# Patient Record
Sex: FEMALE | Race: WHITE
[De-identification: names, ages, dates, MRNs, and addresses within clinical notes are randomized per-mention and may not be internally consistent; named-entity substitution may affect disease eponyms.]

---

## 2017-06-09 ENCOUNTER — HOSPITAL ENCOUNTER (EMERGENCY)
Dept: HOSPITAL 25 - ED | Age: 67
Discharge: HOME | End: 2017-06-09
Payer: MEDICARE

## 2017-06-09 VITALS — DIASTOLIC BLOOD PRESSURE: 86 MMHG | SYSTOLIC BLOOD PRESSURE: 118 MMHG

## 2017-06-09 DIAGNOSIS — Z87.891: ICD-10-CM
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
## 2017-06-09 DIAGNOSIS — F10.129: ICD-10-CM
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
## 2017-06-09 DIAGNOSIS — S00.91XA: ICD-10-CM

## 2017-06-09 DIAGNOSIS — R51: ICD-10-CM

## 2017-06-09 DIAGNOSIS — S00.93XA: Primary | ICD-10-CM

## 2017-06-09 LAB
ALBUMIN SERPL BCG-MCNC: 4 G/DL (ref 3.2–5.2)
ALP SERPL-CCNC: 101 U/L (ref 34–104)
ALT SERPL W P-5'-P-CCNC: 11 U/L (ref 7–52)
ANION GAP SERPL CALC-SCNC: 9 MMOL/L (ref 2–11)
AST SERPL-CCNC: 25 U/L (ref 13–39)
BUN SERPL-MCNC: 7 MG/DL (ref 6–24)
BUN/CREAT SERPL: 10.8 (ref 8–20)
CALCIUM SERPL-MCNC: 9.4 MG/DL (ref 8.6–10.3)
CHLORIDE SERPL-SCNC: 103 MMOL/L (ref 101–111)
GLOBULIN SER CALC-MCNC: 3.6 G/DL (ref 2–4)
GLUCOSE SERPL-MCNC: 100 MG/DL (ref 70–100)
HCO3 SERPL-SCNC: 28 MMOL/L (ref 22–32)
HCT VFR BLD AUTO: 35 % (ref 35–47)
HGB BLD-MCNC: 10.7 G/DL (ref 12–16)
MCH RBC QN AUTO: 25 PG (ref 27–31)
MCHC RBC AUTO-ENTMCNC: 31 G/DL (ref 31–36)
MCV RBC AUTO: 82 FL (ref 80–97)
POTASSIUM SERPL-SCNC: 3.1 MMOL/L (ref 3.5–5)
PROT SERPL-MCNC: 7.6 G/DL (ref 6.4–8.9)
RBC # BLD AUTO: 4.29 10^6/UL (ref 4–5.4)
SODIUM SERPL-SCNC: 140 MMOL/L (ref 133–145)
TROPONIN I SERPL-MCNC: 0 NG/ML (ref ?–0.04)
WBC # BLD AUTO: 5.9 10^3/UL (ref 3.5–10.8)

## 2017-06-09 PROCEDURE — 70450 CT HEAD/BRAIN W/O DYE: CPT

## 2017-06-09 PROCEDURE — 85025 COMPLETE CBC W/AUTO DIFF WBC: CPT

## 2017-06-09 PROCEDURE — 93005 ELECTROCARDIOGRAM TRACING: CPT

## 2017-06-09 PROCEDURE — 83605 ASSAY OF LACTIC ACID: CPT

## 2017-06-09 PROCEDURE — 80320 DRUG SCREEN QUANTALCOHOLS: CPT

## 2017-06-09 PROCEDURE — 72125 CT NECK SPINE W/O DYE: CPT

## 2017-06-09 PROCEDURE — 80053 COMPREHEN METABOLIC PANEL: CPT

## 2017-06-09 PROCEDURE — 85610 PROTHROMBIN TIME: CPT

## 2017-06-09 PROCEDURE — G0480 DRUG TEST DEF 1-7 CLASSES: HCPCS

## 2017-06-09 PROCEDURE — 36415 COLL VENOUS BLD VENIPUNCTURE: CPT

## 2017-06-09 PROCEDURE — 99282 EMERGENCY DEPT VISIT SF MDM: CPT

## 2017-06-09 PROCEDURE — 84484 ASSAY OF TROPONIN QUANT: CPT

## 2017-06-09 NOTE — RAD
INDICATION:  Head injury.



COMPARISON:  Comparison is made with a prior CT of the brain from March 24, 2015.



TECHNIQUE: Contiguous axial sections of the brain were obtained from the skull base to the

vertex without contrast.



FINDINGS: The ventricles, cisterns and sulci are enlarged consistent with diffuse atrophy.

 There are small areas of decreased density in the subcortical and periventricular white

matter suggestive of mild chronic small vessel ischemic changes. There is no evidence for

hemorrhage.



There is soft tissue swelling and hematoma in the scalp adjacent to the posterior right

parietal bone measuring 4.6 x 2.0 cm in size. There is also a small amount of air within

the soft tissues consistent with a laceration type injury. No fracture is seen.



The visual is portion of the paranasal sinuses and mastoid air cells appear clear.



IMPRESSION:  

1. NO EVIDENCE FOR ACUTE INTRACRANIAL ABNORMALITY.

2. SOFT TISSUE SWELLING AND HEMATOMA IN THE SCALP ADJACENT TO THE POSTERIOR RIGHT PARIETAL

BONE.

## 2017-06-09 NOTE — RAD
INDICATION:  Fall, head injury.



COMPARISON:  There are no prior studies available for comparison.



TECHNIQUE: Contiguous axial sections were obtained from the skull base through the C7

vertebra. Images were reconstructed in the sagittal and coronal planes.



FINDINGS: There is straightening of the cervical spine with loss of the normal cervical

lordosis. No prevertebral soft tissue swelling or fracture is seen.



At the C2-C3 level there is mild posterior uncinate process spurring and moderate

hypertrophic changes within the facet joints. No spinal canal narrowing is present. There

is mild neural foraminal narrowing on the right side.



At the C3-C4 level there is mild posterior uncinate process spurring and moderate

hypertrophic changes within the facet joint on the left side. No spinal canal narrowing is

present. There is mild neural foraminal narrowing on the left side.



At the C4-C5 level there is mild posterior uncinate process spurring and mild hypertrophic

changes within the facet joints. No significant spinal canal narrowing is present. There

is mild to moderate bilateral neural foraminal narrowing.



At the C5-C6 level there is mild to moderate posterior uncinate process spurring. This

causes mild spinal canal narrowing. There is moderate bilateral neural foraminal

narrowing.



At the C6-C7 level there is mild to moderate posterior uncinate process spurring. This

causes mild spinal canal narrowing. There is moderate bilateral neural foraminal

narrowing.



Images through the lung apices demonstrate a curvilinear nodular density at the right lung

apex. Recommend a follow-up chest x-ray for further evaluation.



IMPRESSION:  

1. NO EVIDENCE FOR FRACTURE OR SUBLUXATION.

2. MODERATE CERVICAL SPONDYLOSIS.

3. ABNORMAL DENSITY AT THE RIGHT LUNG APEX. RECOMMEND A FOLLOW-UP PA AND LATERAL CHEST

FILMS FOR FURTHER EVALUATION.

## 2017-06-10 NOTE — ED
Carlee KRAMER Alok, scribed for Edward Martinez MD on 06/09/17 at 2243 .





Adult Trauma





- HPI Summary


HPI Summary: 


66F presents to the ED with occipital head pain. Pt states that she fell down 

the steps while leaving her home. Pt describes episode as slip-and-fall event 

falling down a total of 3 steps and states she was able to get up on her own. 

Pt presents with abrasion of the left foot. Pt denies LOC or neck pain. Pt 

denies hip pain. Pt denies CP or SOB. Pt denies fever or chills. Pt denies back 

pain, flank pain, or abd pain. Pt denies anti-coagulants. Pt denies ETOH use 

before fall though admitted to nursing staff of ETOH use today. Nursing staff 

notes confusion in pt.








- History of Current Complaint


Chief Complaint: EDHeadInjury


Stated Complaint: FALL/HEAD INJURY


Time Seen by Provider: 06/09/17 22:05


Hx Obtained From: Patient


Pregnant?: No


Mechanism of Injury: Fall


Loss of Consciousness: no loss of consciousness


Onset/Duration: Started Hours Ago, Traumatic, Still Present


Onset of Pain: Immediate


Onset Severity: Moderate


Current Severity: Moderate


Pain Intensity: 0


Pain Scale Used: 0-10 Numeric


Location: Head


Aggravating Factor(s): Nothing


Alleviating Factor(s): Nothing


Associated Signs & Symptoms: Positive: Other: - abrasion left foot.  Negative: 

SOB, Chest Pain, Fever, Loss of Consciousness





- Additional Pertinent History


Primary Care Physician: [id number]





- Allergy/Home Medications


Allergies/Adverse Reactions: 


 Allergies











Allergy/AdvReac Type Severity Reaction Status Date / Time


 


No Known Allergies Allergy   Verified 02/22/17 13:05














PMH/Surg Hx/FS Hx/Imm Hx


Endocrine/Hematology History: 


   Denies: Hx Anticoagulant Therapy, Hx Diabetes, Hx Systemic Lupus 

Erythematosus


Cardiovascular History: 


   Denies: Hx Congestive Heart Failure, Hx Hypertension, Hx Pacemaker/ICD


GI History: Reports: Hx Gastroesophageal Reflux Disease, Other GI Disorders - 

esophagitis


 History: 


   Denies: Hx Dialysis, Hx Renal Disease


Musculoskeletal History: 


   Denies: Hx Rheumatoid Arthritis, Hx Osteoporosis


Sensory History: 


   Denies: Hx Hearing Aid


Neurological History: Reports: Hx Dementia


Psychiatric History: 


   Denies: Hx Panic Disorder





- Cancer History


Hx Chemotherapy: No


Hx Radiation Therapy: No





- Surgical History


Surgery Procedure, Year, and Place: APPENDIX 1970'S


Infectious Disease History: No


Infectious Disease History: 


   Denies: Traveled Outside the [place] in Last 30 Days





- Family History


Known Family History: Positive: Hypertension


   Negative: Cardiac Disease





- Social History


Occupation: Retired


Alcohol Use: unable to say how many drinks tonight


Alcohol Amount: Patient is recovering alcoholic


Substance Use Type: Reports: None


Smoking Status (MU): Former Smoker





Review of Systems


Negative: Fever, Chills


Negative: Chest Pain


Negative: Shortness Of Breath


Negative: Abdominal Pain


Negative: Other - hip, neck, back, flank pain


Positive: Headache


All Other Systems Reviewed And Are Negative: Yes





Physical Exam





- Summary


Physical Exam Summary: 








The patient is well-nourished in no acute distress and in no acute pain. No 

evidence of other trauma noted besides head.





The skin is warm and dry and skin color reflects adequate perfusion. Good skin 

turgor.





HEENT:  No gutierrez marks or raccoon eyes. Hematoma right occipital area with 

some bleeding. The pupils are equal and reactive. The conjunctivae are clear 

and without drainage.  Nares are patent and without drainage.  Mouth reveals 

moist mucous membranes and the throat is without erythema and exudate. 





Neck is supple with full range of motion and non-tender. There are no carotid 

bruits.  There is no neck vein distension.





Respiratory: Chest is non-tender.  Lungs are clear to auscultation and breath 

sounds are symmetrical and equal.





Cardiovascular: Hear is regular rate and rhythm.  There is no murmur or rub 

auscultated.   There is no peripheral edema and pulses are symmetrical and 

equal.





Abdomen: The abdomen is soft and non-tender.  There are normal bowel sounds 

heard in all four quadrants and there is no organomegaly palpated.





Musculoskeletal: There is no back pain noted.  Extremities are non-tender with 

full range of motion.  There is good capillary refill.  There is no peripheral 

edema or calf tenderness elicited.





Neurological: Patient is alert and oriented to person, place and time.  The 

patient has symmetrical motor strength in all four extremities.  Cranial nerves 

are grossly intact. Deep tendon reflexes are symmetrical and equal in all four 

extremities.





Psychiatric: The patient has an appropriate affect and does not exhibit any 

anxiety or depression. 


Triage Information Reviewed: Yes


Vital Signs On Initial Exam: 


 Initial Vitals











Temp Pulse Resp BP Pulse Ox


 


 97.2 F   83   18   130/84   100 


 


 06/09/17 21:49  06/09/17 21:49  06/09/17 21:49  06/09/17 21:49  06/09/17 21:49











Vital Signs Reviewed: Yes





- Kahlil Coma Scale


Coma Scale Total: 15





Diagnostics





- Vital Signs


 Vital Signs











  Temp Pulse Resp BP Pulse Ox


 


 06/09/17 21:49  97.2 F  83  18  130/84  100














- Laboratory


Lab Results: 


 Lab Results











  06/09/17 06/09/17 06/09/17 Range/Units





  22:43 22:43 22:43 


 


WBC  5.9    (3.5-10.8)  10^3/ul


 


RBC  4.29    (4.0-5.4)  10^6/ul


 


Hgb  10.7 L    (12.0-16.0)  g/dl


 


Hct  35    (35-47)  %


 


MCV  82    (80-97)  fL


 


MCH  25 L    (27-31)  pg


 


MCHC  31    (31-36)  g/dl


 


RDW  17 H    (10.5-15)  %


 


Plt Count  446    (150-450)  10^3/ul


 


MPV  7 L    (7.4-10.4)  um3


 


Neut % (Auto)  47.4    (38-83)  %


 


Lymph % (Auto)  27.1    (25-47)  %


 


Mono % (Auto)  4.6    (1-9)  %


 


Eos % (Auto)  18.0 H    (0-6)  %


 


Baso % (Auto)  2.9 H    (0-2)  %


 


Absolute Neuts (auto)  2.8    (1.5-7.7)  10^3/ul


 


Absolute Lymphs (auto)  1.6    (1.0-4.8)  10^3/ul


 


Absolute Monos (auto)  0.3    (0-0.8)  10^3/ul


 


Absolute Eos (auto)  1.1 H    (0-0.6)  10^3/ul


 


Absolute Basos (auto)  0.2    (0-0.2)  10^3/ul


 


Absolute Nucleated RBC  0    10^3/ul


 


Nucleated RBC %  0    


 


INR (Anticoag Therapy)   0.92   (0.89-1.11)  


 


Sodium    140  (133-145)  mmol/L


 


Potassium    3.1 L  (3.5-5.0)  mmol/L


 


Chloride    103  (101-111)  mmol/L


 


Carbon Dioxide    28  (22-32)  mmol/L


 


Anion Gap    9  (2-11)  mmol/L


 


BUN    7  (6-24)  mg/dL


 


Creatinine    0.65  (0.51-0.95)  mg/dL


 


Est GFR ( Amer)    117.3  (>60)  


 


Est GFR (Non-Af Amer)    91.2  (>60)  


 


BUN/Creatinine Ratio    10.8  (8-20)  


 


Glucose    100  ()  mg/dL


 


Lactic Acid     (0.5-2.0)  mmol/L


 


Calcium    9.4  (8.6-10.3)  mg/dL


 


Total Bilirubin    0.20  (0.2-1.0)  mg/dL


 


AST    25  (13-39)  U/L


 


ALT    11  (7-52)  U/L


 


Alkaline Phosphatase    101  ()  U/L


 


Troponin I    0.00  (<0.04)  ng/mL


 


Total Protein    7.6  (6.4-8.9)  g/dL


 


Albumin    4.0  (3.2-5.2)  g/dL


 


Globulin    3.6  (2-4)  g/dL


 


Albumin/Globulin Ratio    1.1  (1-3)  


 


Serum Alcohol    286 H  (<10)  mg/dL














  06/09/17 Range/Units





  22:43 


 


WBC   (3.5-10.8)  10^3/ul


 


RBC   (4.0-5.4)  10^6/ul


 


Hgb   (12.0-16.0)  g/dl


 


Hct   (35-47)  %


 


MCV   (80-97)  fL


 


MCH   (27-31)  pg


 


MCHC   (31-36)  g/dl


 


RDW   (10.5-15)  %


 


Plt Count   (150-450)  10^3/ul


 


MPV   (7.4-10.4)  um3


 


Neut % (Auto)   (38-83)  %


 


Lymph % (Auto)   (25-47)  %


 


Mono % (Auto)   (1-9)  %


 


Eos % (Auto)   (0-6)  %


 


Baso % (Auto)   (0-2)  %


 


Absolute Neuts (auto)   (1.5-7.7)  10^3/ul


 


Absolute Lymphs (auto)   (1.0-4.8)  10^3/ul


 


Absolute Monos (auto)   (0-0.8)  10^3/ul


 


Absolute Eos (auto)   (0-0.6)  10^3/ul


 


Absolute Basos (auto)   (0-0.2)  10^3/ul


 


Absolute Nucleated RBC   10^3/ul


 


Nucleated RBC %   


 


INR (Anticoag Therapy)   (0.89-1.11)  


 


Sodium   (133-145)  mmol/L


 


Potassium   (3.5-5.0)  mmol/L


 


Chloride   (101-111)  mmol/L


 


Carbon Dioxide   (22-32)  mmol/L


 


Anion Gap   (2-11)  mmol/L


 


BUN   (6-24)  mg/dL


 


Creatinine   (0.51-0.95)  mg/dL


 


Est GFR ( Amer)   (>60)  


 


Est GFR (Non-Af Amer)   (>60)  


 


BUN/Creatinine Ratio   (8-20)  


 


Glucose   ()  mg/dL


 


Lactic Acid  1.5  (0.5-2.0)  mmol/L


 


Calcium   (8.6-10.3)  mg/dL


 


Total Bilirubin   (0.2-1.0)  mg/dL


 


AST   (13-39)  U/L


 


ALT   (7-52)  U/L


 


Alkaline Phosphatase   ()  U/L


 


Troponin I   (<0.04)  ng/mL


 


Total Protein   (6.4-8.9)  g/dL


 


Albumin   (3.2-5.2)  g/dL


 


Globulin   (2-4)  g/dL


 


Albumin/Globulin Ratio   (1-3)  


 


Serum Alcohol   (<10)  mg/dL











Result Diagrams: 


 06/09/17 22:43





 06/09/17 22:43


Lab Statement: Any lab studies that have been ordered have been reviewed, and 

results considered in the medical decision making process.





- CT


  ** Brain CT


CT Interpretation: Positive (See Comments) - IMPRESSION:   1. NO EVIDENCE FOR 

ACUTE INTRACRANIAL ABNORMALITY. 2. SOFT TISSUE SWELLING AND HEMATOMA IN THE 

SCALP ADJACENT TO THE POSTERIOR RIGHT PARIETAL BONE.


CT Interpretation Completed By: Radiologist





  ** Cervical Spine CT


CT Interpretation: Positive (See Comments) - IMPRESSION:   1. NO EVIDENCE FOR 

FRACTURE OR SUBLUXATION. 2. MODERATE CERVICAL SPONDYLOSIS. 3. ABNORMAL DENSITY 

AT THE RIGHT LUNG APEX. RECOMMEND A FOLLOW-UP PA AND LATERAL CHEST FILMS FOR 

FURTHER EVALUATION.


CT Interpretation Completed By: Radiologist





- EKG


  ** 0518


Cardiac Rate: Other Rate - 70 bpm


EKG Interpretation: RBBB. Normal axis. No ST elevation.





Adult Trauma Course/Dx





- Course


Course Of Treatment: Pt presented with occupital head pain following fall. Pt 

is ETOH intoxicated. CT Brain and CT cervical spine done and show no fracture. 

Pt's daughter has arrived and will take pt home. Will discharge with 

recommendation to FU with PCP.





- Diagnoses


Differential Diagnosis/HQI/PQRI: Positive: Contusion(s), Fracture, Hematoma(s), 

Laceration(s), Other - acute alcoholic intoxication


Provider Diagnoses: 


 Head contusion, Abrasion head, Acute alcohol intoxication








Discharge





- Discharge Plan


Condition: Stable


Disposition: HOME


Patient Education Materials:  Head Injury (ED), Alcohol Intoxication (ED)


Referrals: 


Lovely Arroyo MD [Primary Care Provider] - 


Additional Instructions: 


Please apply ice to your head and follow up with your primary care provider.





The documentation as recorded by the Carlee parada Alok accurately reflects 

the service I personally performed and the decisions made by me, Edward Martinez MD.

## 2019-07-25 ENCOUNTER — HOSPITAL ENCOUNTER (EMERGENCY)
Dept: HOSPITAL 25 - ED | Age: 69
Discharge: HOME | End: 2019-07-25
Payer: MEDICARE

## 2019-07-25 VITALS — DIASTOLIC BLOOD PRESSURE: 72 MMHG | SYSTOLIC BLOOD PRESSURE: 105 MMHG

## 2019-07-25 DIAGNOSIS — K21.0: ICD-10-CM

## 2019-07-25 DIAGNOSIS — Z87.891: ICD-10-CM

## 2019-07-25 DIAGNOSIS — F10.929: Primary | ICD-10-CM

## 2019-07-25 PROCEDURE — 36415 COLL VENOUS BLD VENIPUNCTURE: CPT

## 2019-07-25 PROCEDURE — 99283 EMERGENCY DEPT VISIT LOW MDM: CPT

## 2019-07-25 PROCEDURE — 80320 DRUG SCREEN QUANTALCOHOLS: CPT

## 2019-07-25 PROCEDURE — G0480 DRUG TEST DEF 1-7 CLASSES: HCPCS

## 2019-07-26 NOTE — ED
Substance Abuse/Use





- HPI Summary


HPI Summary: 


Patient is a 68-year-old female who was found by police with a near empty 

bottle of vodka near her and acting inappropriately and confused.  She was 

brought to the ED by way of EMS for alcohol intoxication.  No signs of trauma.  

Patient ambulating well, however with a staggering gait.  Patient denies any 

drug use and states she just wanted to "relax today."  She states she does 

remember walking around her neighborhood with a bottle of vodka.  Patient 

states she drinks approximately once to twice per week.  Patient is cooperative

, not acting hostile and is pleasant.  Denies any SI/HI.








- History Of Current Complaint


Chief Complaint: EDSubstanceAbuse


Stated Complaint: 2209 PER LAW


Time Seen by Provider: 07/25/19 14:19


Hx Obtained From: Patient


Pregnant?: No


Onset/Duration  of Drug/ETOH Abuse: Hours


Ingestion History: Amount Ingested - unknown


Overdose Characteristics: Oral


Timing Of Abuse: Intermittent


Severity Initially: Moderate


Severity Currently: Moderate


Aggravating Factor(s): Nothing


Alleviating Factor(s): Nothing


Associated Signs And Symptoms: Negative





- Risk Factor(s)


Completed Suicide Risk Factors: Negative





- Allergies/Home Medications


Allergies/Adverse Reactions: 


 Allergies











Allergy/AdvReac Type Severity Reaction Status Date / Time


 


No Known Allergies Allergy   Verified 11/17/17 10:29











Home Medications: 


 Home Medications





NK [No Home Medications Reported]  07/25/19 [History Confirmed 07/25/19]











PMH/Surg Hx/FS Hx/Imm Hx


Previously Healthy: Yes


Endocrine/Hematology History: 


   Denies: Hx Anticoagulant Therapy, Hx Diabetes, Hx Systemic Lupus 

Erythematosus


Cardiovascular History: 


   Denies: Hx Congestive Heart Failure, Hx Hypertension, Hx Pacemaker/ICD


GI History: Reports: Hx Gastroesophageal Reflux Disease, Other GI Disorders - 

esophagitis


 History: 


   Denies: Hx Dialysis, Hx Renal Disease


Musculoskeletal History: 


   Denies: Hx Rheumatoid Arthritis, Hx Osteoporosis


Sensory History: 


   Denies: Hx Hearing Aid


Neurological History: Reports: Hx Dementia


Psychiatric History: 


   Denies: Hx Panic Disorder





- Cancer History


Hx Chemotherapy: No


Hx Radiation Therapy: No





- Surgical History


Surgery Procedure, Year, and Place: APPENDIX 1970'S





- Immunization History


Hx Pertussis Vaccination: No


Immunizations Up to Date: Yes


Infectious Disease History: No


Infectious Disease History: 


   Denies: Traveled Outside the [place] in Last 30 Days





- Family History


Known Family History: Positive: Hypertension


   Negative: Cardiac Disease





- Social History


Occupation: Unemployed


Lives: Alone


Alcohol Use: None


Alcohol Amount: Patient is an alcoholic


Hx Substance Use: No


Substance Use Type: Reports: None


Hx Tobacco Use: Yes


Smoking Status (MU): Former Smoker





Review of Systems


Constitutional: Negative


Negative: Fever, Chills, Fatigue, Skin Diaphoresis


Negative: Palpitations, Chest Pain


Negative: Shortness Of Breath, Cough


Genitourinary: Negative


Positive: no symptoms reported, see HPI


Negative: Arthralgia, Myalgia


Positive: Slurred Speech


All Other Systems Reviewed And Are Negative: Yes





Physical Exam


Triage Information Reviewed: Yes


Vital Signs On Initial Exam: 


 Initial Vitals











Temp Pulse Resp BP Pulse Ox


 


 98.6 F   96   16   98/79   97 


 


 07/25/19 13:41  07/25/19 13:41  07/25/19 13:41  07/25/19 13:41  07/25/19 13:41











Vital Signs Reviewed: Yes


Appearance: Positive: Well-Appearing, No Pain Distress


Skin: Positive: Warm, Skin Color Reflects Adequate Perfusion


Head/Face: Positive: Normal Head/Face Inspection


Eyes: Positive: EOMI, Conjunctiva Clear


Cardiovascular: Positive: RRR, Pulses are Symmetrical in both Upper and Lower 

Extremities


Musculoskeletal: Positive: Strength/ROM Intact


Neurological: Positive: Sensory/Motor Intact, Alert, Oriented to Person Place, 

Time, Speech Normal


Psychiatric: Positive: Affect/Mood Appropriate





Diagnostics





- Vital Signs


 Vital Signs











  Temp Pulse Resp BP Pulse Ox


 


 07/25/19 19:44  98 F  85  14  105/72  98


 


 07/25/19 19:02  98.2 F  101  16  100/77  99


 


 07/25/19 17:20  98.3 F  89  16  100/65  98


 


 07/25/19 13:41  98.6 F  96  16  98/79  97














- Laboratory


Lab Results: 


 Lab Results











  07/25/19 Range/Units





  14:28 


 


Serum Alcohol  276 H  (<10)  mg/dL











Lab Statement: Any lab studies that have been ordered have been reviewed, and 

results considered in the medical decision making process.





Course/Dx





- Course


Course Of Treatment: This patient is evaluated for alcohol intoxication.  

Patient is acting intoxicated, however there are no signs of trauma, no obvious 

drug use, patient is ambulating however with unsteady gait.  Alcohol 276.  As 

patient is otherwise stable at this time, we will sober her up and she will be 

ready for discharge when clinically sober.  She is signed out to Cachorro Jovel NP pending sobriety.





- Diagnoses


Provider Diagnoses: 


 Alcohol intoxication








Discharge





- Sign-Out/Discharge


Documenting (check all that apply): Patient Departure


Patient Received Moderate/Deep Sedation with Procedure: No





- Discharge Plan


Condition: Stable


Disposition: HOME


Patient Education Materials:  Alcohol Intoxication (ED)


Referrals: 


Lovely Arroyo MD [Primary Care Provider] - 3 Days (If needed.)


Additional Instructions: 


Stop drinking alcohol.





- Billing Disposition and Condition


Condition: STABLE


Disposition: Home

## 2022-09-27 NOTE — ED
Course/Dx





- Diagnoses


Provider Diagnoses: 


 Alcohol intoxication








Discharge





- Sign-Out/Discharge


Documenting (check all that apply): Patient Departure


Patient Received Moderate/Deep Sedation with Procedure: No





- Discharge Plan


Condition: Stable


Disposition: HOME


Patient Education Materials:  Alcohol Intoxication (ED)


Referrals: 


Lovely Arroyo MD [Primary Care Provider] - 3 Days (If needed.)


Additional Instructions: 


Stop drinking alcohol.





- Billing Disposition and Condition


Condition: STABLE


Disposition: Home PSG and follow-up appointment with provider are both scheduled.  Denice Downs, CMA